# Patient Record
Sex: MALE | Race: WHITE
[De-identification: names, ages, dates, MRNs, and addresses within clinical notes are randomized per-mention and may not be internally consistent; named-entity substitution may affect disease eponyms.]

---

## 2021-05-26 PROBLEM — Z00.00 ENCOUNTER FOR PREVENTIVE HEALTH EXAMINATION: Status: ACTIVE | Noted: 2021-05-26

## 2021-05-28 ENCOUNTER — APPOINTMENT (OUTPATIENT)
Dept: COLORECTAL SURGERY | Facility: CLINIC | Age: 61
End: 2021-05-28
Payer: COMMERCIAL

## 2021-05-28 VITALS
BODY MASS INDEX: 25.92 KG/M2 | WEIGHT: 175 LBS | TEMPERATURE: 97.2 F | DIASTOLIC BLOOD PRESSURE: 81 MMHG | HEART RATE: 51 BPM | HEIGHT: 69 IN | SYSTOLIC BLOOD PRESSURE: 142 MMHG | RESPIRATION RATE: 14 BRPM

## 2021-05-28 PROCEDURE — 99203 OFFICE O/P NEW LOW 30 MIN: CPT | Mod: 25

## 2021-05-28 PROCEDURE — 46600 DIAGNOSTIC ANOSCOPY SPX: CPT

## 2021-05-28 PROCEDURE — 99072 ADDL SUPL MATRL&STAF TM PHE: CPT

## 2021-06-10 NOTE — HISTORY OF PRESENT ILLNESS
[FreeTextEntry1] : Here for evaluation of hemorrhoids\par \par s/p hemorrhoidectomy 25 years ago.  Was told that he had internal hemorrhoids.  Since then no rectal bleeding.  Over the past couple months was fasting.  Had a very large BM. and was constipated on several occasions.  Started noticing protrusion.  Was very constipated 2 weeks ago and strained had "the largest BM he ever had" and then afterwards had some hemorrhoid irritation.  No bleeding.  Since then stools are not as bad but still kind of constipated.  has been working out a lot.  Does not drink a lot fluids.  \par \par Last colonoscopy 8/2019 for screening was normal, recall in 10 years.\par \par NKDA\par PMH remarkable for hypertension\par PSH remarkable for s/p hemorrhoidectomy\par \par no family history of colon cancer\par

## 2021-06-10 NOTE — PHYSICAL EXAM
[de-identified] : no perianal erythema or induration.  scattered skin tags, not inflammed or excoriated.  Thrombosed external hemorrhoid RLQ, not ulcerated, nontender to palpation.  NORY shows no internal extension. normal tone.

## 2021-06-10 NOTE — PROCEDURE
[FreeTextEntry1] : Anoscopy recommended as part of further workup.  Risks include bleeding and discomfort discussed.  Questions answered.  Pt provides verbal consent.\par \par Well lubricated anoscope inserted into anus.  All 4 quadrants examined.\par Anoscopy shows  small internal hemorrhoids, not inflammed, no fissure\par \par Pt tolerated procedure.\par

## 2021-06-10 NOTE — ASSESSMENT
[FreeTextEntry1] : Thrombosed external hemorrhoid\par --Thrombosed external hemorrhoid seen RLQ, minimally tender.  At this time, do not recommend excision.  Recommend warm baths as needed.  It should go away as body processes the clot.  You may be left with a skin tag.  It is possible to see bleeding over the next couple weeks, from ulceration of the ulceration of skin from wiping over the blood clot.  This is usually minor and self limited.\par --If you have worsening pain, fevers, trouble urinating, or severe bleeding, call office for urgent re-evaluation\par --in the meantime, please increase fluid intake 64oz daily to address your constipation which I think is contributing to thrombosed hemorrhoids.

## 2021-06-10 NOTE — CONSULT LETTER
[Dear  ___] : Dear  [unfilled], [Consult Letter:] : I had the pleasure of evaluating your patient, [unfilled]. [( Thank you for referring [unfilled] for consultation for _____ )] : Thank you for referring [unfilled] for consultation for [unfilled] [Please see my note below.] : Please see my note below. [Consult Closing:] : Thank you very much for allowing me to participate in the care of this patient.  If you have any questions, please do not hesitate to contact me. [Sincerely,] : Sincerely, [FreeTextEntry2] : Delmi Merdano ND [FreeTextEntry3] : Rah Singh MD FACS\par

## 2023-03-24 ENCOUNTER — APPOINTMENT (OUTPATIENT)
Dept: GASTROENTEROLOGY | Facility: CLINIC | Age: 63
End: 2023-03-24
Payer: COMMERCIAL

## 2023-03-24 VITALS
HEIGHT: 69 IN | HEART RATE: 58 BPM | BODY MASS INDEX: 27.11 KG/M2 | WEIGHT: 183 LBS | OXYGEN SATURATION: 100 % | DIASTOLIC BLOOD PRESSURE: 87 MMHG | SYSTOLIC BLOOD PRESSURE: 151 MMHG

## 2023-03-24 DIAGNOSIS — R14.2 ERUCTATION: ICD-10-CM

## 2023-03-24 DIAGNOSIS — R10.13 EPIGASTRIC PAIN: ICD-10-CM

## 2023-03-24 DIAGNOSIS — K64.5 PERIANAL VENOUS THROMBOSIS: ICD-10-CM

## 2023-03-24 DIAGNOSIS — K21.9 GASTRO-ESOPHAGEAL REFLUX DISEASE W/OUT ESOPHAGITIS: ICD-10-CM

## 2023-03-24 DIAGNOSIS — K64.9 UNSPECIFIED HEMORRHOIDS: ICD-10-CM

## 2023-03-24 DIAGNOSIS — I10 ESSENTIAL (PRIMARY) HYPERTENSION: ICD-10-CM

## 2023-03-24 PROCEDURE — 99203 OFFICE O/P NEW LOW 30 MIN: CPT

## 2023-03-24 NOTE — ASSESSMENT
[FreeTextEntry1] : Sunil is a very pleasant 62-year-old male who presents for initial GI evaluation for chronic reflux and to determine if he has a hiatal hernia.  He is a chiropractor and very physically fit, works out on a regular basis.  He has followed closely with his primary care doctor, has no family history of GI cardiac or other disorders.  He had a colonoscopy 4 years ago, no polyps or other abnormalities.  He has had hemorrhoids for which he has seen colorectal surgeon within the last couple of years. He currently does not have typical reflux symptoms. \par \par Plan:\par \par 1) Gastroenterology: \par \par At the present time, we will proceed with scheduling an EGD.  His symptoms were likely due to increased intra-abdominal pressure during physical exertion.  We will rule out esophagitis, peptic ulcer disease, and complications of hiatal hernia.  In addition, will screen for intestinal metaplasia or other long-term complications associated with longstanding acid reflux.  Of note, he does not have classic symptoms or history of chronic typical reflux.\par \par 2) Disposition: Rafiq will return to see me in 4-6 weeks after the above studies are obtained. My office will be in touch with her as results become available.  \par \par In the meantime, he should not hesitate to contact my office with any additional questions.\par

## 2023-03-24 NOTE — HISTORY OF PRESENT ILLNESS
[FreeTextEntry1] : Sunil is a very pleasant 62-year-old male who presents for initial GI evaluation for chronic reflux and to determine if he has a hiatal hernia.  He is a chiropractor and very physically fit, works out on a regular basis.  He has followed closely with his primary care doctor, has no family history of GI cardiac or other disorders.  He had a colonoscopy 4 years ago, no polyps or other abnormalities.  He has had hemorrhoids for which he has seen colorectal surgeon within the last couple of years.

## 2023-03-24 NOTE — PHYSICAL EXAM
[Alert] : alert [Normal Voice/Communication] : normal voice/communication [Healthy Appearing] : healthy appearing [No Acute Distress] : no acute distress [Sclera] : the sclera and conjunctiva were normal [Hearing Threshold Finger Rub Not [place]] : hearing was normal [Normal Lips/Gums] : the lips and gums were normal [Oropharynx] : the oropharynx was normal [Normal Appearance] : the appearance of the neck was normal [No Neck Mass] : no neck mass was observed [No Respiratory Distress] : no respiratory distress [No Acc Muscle Use] : no accessory muscle use [Respiration, Rhythm And Depth] : normal respiratory rhythm and effort [Auscultation Breath Sounds / Voice Sounds] : lungs were clear to auscultation bilaterally [Heart Rate And Rhythm] : heart rate was normal and rhythm regular [Normal S1, S2] : normal S1 and S2 [Murmurs] : no murmurs [Bowel Sounds] : normal bowel sounds [Abdomen Tenderness] : non-tender [No Masses] : no abdominal mass palpated [Abdomen Soft] : soft [] : no rash [Normal Affect] : the affect was normal

## 2023-03-24 NOTE — REASON FOR VISIT
[Initial Evaluation] : an initial evaluation [FreeTextEntry1] : Episode of epigastric and chest discomfort during weightlifting (Valsalva with resultant regurgitation and discomfort)